# Patient Record
Sex: FEMALE | Race: WHITE | Employment: UNEMPLOYED | URBAN - METROPOLITAN AREA
[De-identification: names, ages, dates, MRNs, and addresses within clinical notes are randomized per-mention and may not be internally consistent; named-entity substitution may affect disease eponyms.]

---

## 2018-10-12 ENCOUNTER — OFFICE VISIT (OUTPATIENT)
Dept: NEUROLOGY | Age: 23
End: 2018-10-12

## 2018-10-12 VITALS
TEMPERATURE: 98 F | HEART RATE: 80 BPM | WEIGHT: 277.5 LBS | HEIGHT: 64 IN | DIASTOLIC BLOOD PRESSURE: 85 MMHG | OXYGEN SATURATION: 99 % | BODY MASS INDEX: 47.37 KG/M2 | RESPIRATION RATE: 18 BRPM | SYSTOLIC BLOOD PRESSURE: 121 MMHG

## 2018-10-12 DIAGNOSIS — H53.9 VISUAL DISTURBANCE: ICD-10-CM

## 2018-10-12 DIAGNOSIS — H54.7 VISUAL LOSS: Primary | ICD-10-CM

## 2018-10-12 DIAGNOSIS — R07.9 CHEST PAIN, UNSPECIFIED TYPE: ICD-10-CM

## 2018-10-12 DIAGNOSIS — R42 DIZZY: ICD-10-CM

## 2018-10-12 DIAGNOSIS — G44.59 OTHER COMPLICATED HEADACHE SYNDROME: ICD-10-CM

## 2018-10-12 PROBLEM — E66.01 OBESITY, MORBID (HCC): Status: ACTIVE | Noted: 2018-10-12

## 2018-10-12 RX ORDER — NORGESTIMATE AND ETHINYL ESTRADIOL 0.25-0.035
1 KIT ORAL DAILY
COMMUNITY

## 2018-10-12 RX ORDER — HYDROXYZINE HYDROCHLORIDE 10 MG/1
10 TABLET, FILM COATED ORAL
COMMUNITY

## 2018-10-12 RX ORDER — CHOLECALCIFEROL TAB 125 MCG (5000 UNIT) 125 MCG
5000 TAB ORAL DAILY
COMMUNITY

## 2018-10-12 RX ORDER — BUPROPION HYDROCHLORIDE 300 MG/1
300 TABLET ORAL
COMMUNITY

## 2018-10-12 RX ORDER — DEXAMETHASONE 2 MG/1
TABLET ORAL
Qty: 20 TAB | Refills: 0 | Status: SHIPPED | OUTPATIENT
Start: 2018-10-12

## 2018-10-12 NOTE — PROGRESS NOTES
I have reviewed the documentation provided by the nurse practitioner, Mrs. Tam December, and we have discussed her findings and the clinical impression. I have formulated with her the proposed management plans for this patient. Additionally,  I have personally evaluated the patient to verify the history and to confirm physical findings. Below are my additional comments: 
27-year-old woman who presents accompanied by her mother for ongoing headaches. She has had headaches and she was 16years of age. She never sought any treatment for them. She notes that she would take an occasional Advil. Over the last several months she has had increasing headaches to the point of its a daily headache. She is not using any medication. The only thing that she found that would help her was Advil and in questioning her she might take an Advil twice a month. Her mother adds that the grandmother apparently would take large amounts of Tylenol and that ended up affecting her liver so they therefore do not use Tylenol at all and they minimize any type of medication that they take. Interestingly the headache she has now she says are similar to the headache she had when she was younger although these are much more intense. In addition to the headache she is also having visual disturbance where she is actually lost vision in the left upper quadrant that lasted for a few seconds to a minute. She is also had visual disturbances including what she calls the sparkles are fairy dust where she sees sparkling shimmering lights that occurs a couple 3 times a month. She also had an episode while driving where she noted the road became distorted. She does not get a headache after this. Cannot correlate it to any type of head pain or activity. Nothing positional.  She feels lightheaded and dizzy but does not pass out. She has not had any focal deficits. There is no family history of headache per her mother.   She initially thought her headache problems were due to eyeglasses and she got a pair of eyeglasses changed last [place] or so and she is not had any difference in her symptoms. She has not fallen out on the floor. Does not awaken having bitten her tongue or wet herself. She has not had any testing. This is the first evaluation she has had. She is never been on any prescription medication or either in a preventative sense or an abortive signs for headaches. She is also having some chest pain intermittent as described. She is not seeing cardiology. Mother adds that the there are family members who have had MIs in their 25s. Examination today finds her to be obese. (She notes that her weight is been stable) she has sharp disc margins. Cranial nerves intact 2 through 12. No nystagmus. No pronation or drift and no abnormal movement. Bulk and tone are normal.  Strength is full throughout. Reflexes are a bit brisk but symmetrical throughout. No pathologic reflexes elicited. No ataxia. 52-year-old woman with history of headaches which from her younger days seeing migraine is now in more of a chronic daily headache type situation and this is not being driven by analgesic overuse differential diagnosis certainly includes transformed migraine versus the structural abnormality which may be related versus other. Pseudotumor I think even though she is obese is lower on the list secondary to the sharp disc margins and therefore that would exclude pseudotumor cerebra. She at this point will undergo MRI of the brain will for structural causes of secondary headache. She will have a carotid Doppler and EEG as well as a visual evoked potential secondary to the visual disturbances and the lightheadedness. We will give her a Decadron taper to see if we can break this headache cycle. No routine medications ordered today. Follow-up after her testing. Maximo Leos MD 
This note will not be viewable in [address]

## 2018-10-12 NOTE — MR AVS SNAPSHOT
[address] [place], [place] [address] Vonda Spencer [address] [facility] 99 [id number] [phone] Patient: Sg Crystal MRN: [id number] :1995 Visit Information Date & Time Provider Department Dept. Phone Encounter #  
 10/12/2018  1:30 PM Robe Hawkins MD [facility] Neurology [facility] [phone] [id number] Follow-up Instructions Return for After Tests. Upcoming Health Maintenance Date Due  
 HPV Age 9Y-34Y (1 of 1 - Female 3 Dose Series) 2007 DTaP/Tdap/Td series (1 - Tdap) 2017 PAP AKA CERVICAL CYTOLOGY 2017 Influenza Age 5 to Adult 2018 Allergies as of 10/12/2018  Review Complete On: 10/12/2018 By: Denny Rodrigez NP No Known Allergies Current Immunizations  Never Reviewed No immunizations on file. Not reviewed this visit You Were Diagnosed With   
  
 Codes Comments Visual loss    -  Primary ICD-10-CM: H54.7 ICD-9-CM: 369.9 Visual disturbance     ICD-10-CM: H53.9 ICD-9-CM: 368.9 Dizzy     ICD-10-CM: R44 ICD-9-CM: 780.4 Other complicated headache syndrome     ICD-10-CM: G44.59 
ICD-9-CM: 339.44 Chest pain, unspecified type     ICD-10-CM: R07.9 ICD-9-CM: 786.50 Vitals BP Pulse Temp Resp Height(growth percentile) Weight(growth percentile) 121/85 (BP 1 Location: Left arm, BP Patient Position: Sitting) 80 98 °F (36.7 °C) (Oral) 18 5' 4.3\" (1.633 m) 277 lb 8 oz (125.9 kg) SpO2 BMI Smoking Status 99% 47.19 kg/m2 Never Smoker BMI and BSA Data Body Mass Index Body Surface Area  
 47.19 kg/m 2 2.39 m 2 [facility] [address] [phone] Your Updated Medication List  
  
   
This list is accurate as of 10/12/18  3:09 PM.  Always use your most recent med list.  
  
  
  
  
 buPROPion  mg XL tablet Commonly known as:  Mallory Domenica Take 300 mg by mouth every morning. dexamethasone 2 mg tablet Commonly known as:  DECADRON  
3 po every day x 3 then 2 po every day x 3 then 1 po every day x 3 then stop  
  
 hydrOXYzine HCl 10 mg tablet Commonly known as:  ATARAX Take 10 mg by mouth nightly as needed for Itching. [facility] () 0.25-35 mg-mcg Tab Generic drug:  norgestimate-ethinyl estradiol Take 1 Tab by mouth daily. VITAMIN D3 5,000 unit Tab tablet Generic drug:  cholecalciferol (VITAMIN D3) Take 5,000 Units by mouth daily. Prescriptions Sent to Pharmacy Refills  
 dexamethasone (DECADRON) 2 mg tablet 0 Sig: 3 po every day x 3 then 2 po every day x 3 then 1 po every day x 3 then stop Class: Normal  
 Pharmacy: [address] #: [phone] We Performed the Following REFERRAL TO CARDIOLOGY [UYZ41 Custom] Comments:  
 Chest pain VISUAL EVOKED POTENTIAL TEST [83213 CPT(R)] Follow-up Instructions Return for After Tests. To-Do List   
 10/12/2018 Imaging:  DUPLEX CAROTID BILATERAL AMB NEURO   
  
 10/12/2018 Neurology:  EEG AMB NEURO   
  
 10/12/2018 Imaging:  MRI BRAIN W WO CONT Referral Information Referral ID Referred By Referred To  
  
 [id number] Darrick CASTILLO MD   
   [place] [address] [address] [place] [address] Phone: [phone] Fax: [phone] Visits Status Start Date End Date 1 New Request 10/12/18 10/12/19 If your referral has a status of pending review or denied, additional information will be sent to support the outcome of this decision. Introducing [facility]! [employer] introduces [employer] patient portal. Now you can access parts of your medical record, email your doctor's office, and request medication refills online.    
 
1. In your internet browser, go to [email or web address] 2. Click on the First Time User? Click Here link in the Sign In box. You will see the New Member Sign Up page. 3. Enter your PushPoint Access Code exactly as it appears below. You will not need to use this code after youve completed the sign-up process. If you do not sign up before the expiration date, you must request a new code. · PushPoint Access Code: [email or web address] Expires: 1/10/2019  1:11 PM 
 
4. Enter the last four digits of your Social Security Number (xxxx) and Date of Birth (mm/dd/yyyy) as indicated and click Submit. You will be taken to the next sign-up page. 5. Create a [employer] ID. This will be your PushPoint login ID and cannot be changed, so think of one that is secure and easy to remember. 6. Create a PushPoint password. You can change your password at any time. 7. Enter your Password Reset Question and Answer. This can be used at a later time if you forget your password. 8. Enter your e-mail address. You will receive e-mail notification when new information is available in [address] 9. Click Sign Up. You can now view and download portions of your medical record. 10. Click the Download Summary menu link to download a portable copy of your medical information. If you have questions, please visit the Frequently Asked Questions section of the PushPoint website. Remember, PushPoint is NOT to be used for urgent needs. For medical emergencies, dial 911. Now available from your iPhone and Android! Please provide this summary of care documentation to your next provider. Your primary care clinician is listed as Phys Other. If you have any questions after today's visit, please call [phone].

## 2018-10-12 NOTE — PROGRESS NOTES
Tamie We-07-A [address]  
[address] [phone] Fax Referring: Dr. Dee Alex Chief Complaint Patient presents with  Migraine  New Patient CC- Patient presents with her mother for evaluation of migraines. HPI-  Patient reports she started with headaches at about 16years old and they have gradually worsened over the years. At first her headaches were once a month, then up to several times per week, and for the past one month they have been almost daily (maybe 2 days her head did not hurt in past one monht). Her headaches start with a feeling of pressure inside her nose and the pain starts in her maxillary sinuses area bilaterally and then spreads to her frontal area and then her entire head and her posterior neck feels sore until she moves her neck and cracks it. Her headache pain is throbbing or sometimes squeezing (on the top of her head), the intensity is 4/10 earlier in the day and then increases to 10/10 during the day, she has phonophobia and photophobia, nausea (which has increased in past 2 weeks) and sometimes vomiting. She sometimes gets fuzzy vision before the onset of her headaches for a few minutes She denies any numbness, tingling or weakness. Patient reports her headaches get worse around the middle of her menses. She has not other known headache triggers. She used to take advil once a week, but she stopped taking it one month ago due to it did not help. She denies taking any other pain medications. She states she \"just sleeps\" which sometimes helps her headaches. She has never taken a preventative medication for her headaches. She has never been evaluated for headaches. Five days ago when she was driving the road seemed to sway and her body felt light and she felt nausea- this lasted about 10 minutes.   Yesterday, when she was driving she had sudden onset of very fuzzy vision in the upper outer quadrant of her left eye that lasted about 2-3 seconds. She also reports seeing occasional  \"sparklies\"  in both of her eyes about 2-3 times per month. She stats her last eye exam was in 12/17. She started having intermittent dizziness/lighheadedness ever  since her headaches started at 16years of age, but this has been worse in the past one month. She currently feels dizziness every few minutes and she also somtimes feels like there is cotton in her ears for a few seconds and her balance feels off when she is dizzy. She also started having vertigo (room spinning) a few times in the past 2 month which lasts about 10 minutes. She denies any tinnitus or hearing loss. She denies any fainting, jerking, twitching or incontinence. She states her weight has been the same for the past 6 months. For the past one year she has had intermittent squeezing chest pain about 2 times per week that lasts 2 minutes. She has not reported this to her PCP or any doctor. PMH-  She sees her PCP yearly for physicals. She has no known thyroid problems or diabetes. She states her last blood work was 3 months ago and her Vitamin D was low so she was started on a  daily Vitamin D supplement. She has had anxiety and depression since she was 14years old . She has been taking Wellbutrin for the past 6 months which she states has helped her anxiety and depression. She denies any suicidal ideation. She is treated by the NP at her PCP's office. She used to see a therapist in 7th and 8th grade. .  
 
Patient states she sleeps through the night, but still wakes up tired in the morning. She denies any sinus problems. She has not had a Head CT or Brain MRI. FH- no migraines. Positive for heart disease. Social/personal-  lives with her Tacy Crigler,  works 2 part time jobs and goes to [employer].   
Alcohol- no  
 Smoking - no 
Drugs-  No  
 
Past Medical History:  
Diagnosis Date  Anxiety  Asthma  Depression History reviewed. No pertinent surgical history. Current Outpatient Prescriptions Medication Sig Dispense Refill  buPROPion XL (WELLBUTRIN XL) 300 mg XL tablet Take 300 mg by mouth every morning.  cholecalciferol, VITAMIN D3, (VITAMIN D3) 5,000 unit tab tablet Take 5,000 Units by mouth daily.  norgestimate-ethinyl estradiol (SPRINTEC, 28,) 0.25-35 mg-mcg tab Take 1 Tab by mouth daily.  hydrOXYzine HCl (ATARAX) 10 mg tablet Take 10 mg by mouth nightly as needed for Itching.     0 No Known Allergies Social History Substance Use Topics  Smoking status: Never Smoker  Smokeless tobacco: Never Used  Alcohol use No  
 
 
Family History Problem Relation Age of Onset  Asthma Mother  Depression Mother  No Known Problems Father  Other Maternal Grandfather   
  hypoglycemia  Diabetes Paternal Grandfather  Cancer Paternal Aunt   
  breast  
 Cancer Maternal Grandmother   
  breast  
 
  
Review of Systems-  history obtained from patient. General ROS: negative Psychological ROS: see HPI HEENT ROS: current 7/10 headache. See HPI for eye symptoms. Denies any current blurred or double vision. Denies any eye pain. Hematological and Lymphatic ROS: negative Endocrine ROS:negative Respiratory ROS: negative, denies any SOB Cardiovascular ROS: see HPI Gastrointestinal ROS: has mild nausea and intermittent abdominal cramping Genito-Urinary ROS: negative Musculoskeletal ROS: negative Neurological ROS: see HPI Dermatological ROS: negative Examination Visit Vitals  /85 (BP 1 Location: Left arm, BP Patient Position: Sitting)  Pulse 80  Temp 98 °F (36.7 °C) (Oral)  Resp 18  Ht 5' 4.3\" (1.633 m)  Wt 125.9 kg (277 lb 8 oz)  SpO2 99%  BMI 47.19 kg/m2 Physical Exam -  
  
 General - Patient is alert in NAD, she is obese, well groomed and color normal.  
HEENT- head is normocephalic, sclera clear,  nose and throat are clear Neck- supple, no carotid bruit Chest - CTA A/P/L, full breath sounds bilaterally Heart - RRR, no murmur Abdomen- not distended, no tender to palpation Musculoskeletal- normal posture, FROM of joints Extremities - warm and no edema Skin- no rashes or lesions Psychiatric- normal mood and bright affect 
  
Neurological- Alert and oriented to person, place and time. Speech is clear- no aphasia or dysarthria. Recent and remote memory appear intact. Cranial nerves II-XII are intact- visual acuity is grossly intact, visual fields are full to confrontation,  Fundoscopic- no papilledema, PERRLA, EOMS intact, no nystagmus or ptosis, facial sensation normal and masseter strength intact, no facial asymmetry, facial movements are symmetrical and normal strength, hearing intact, palate rises symmetrically, sternocleidomastoid and trapezius strength 5/5 bilaterally, tongue midline. Motor System- strength 5/5 to upper and lower extremities bilaterally, normal muscle bulk and tone, no tremor. Sensation- intact to light touch and temperature throughout. Gait- normal and steady; toe, heel and tandem gait intact. Coordination - finger to nose accurate, SHRUTHI intact, Romberg negative, no pronator drift. Reflexes- 2+ to upper and lower extremities bilaterally.  
  
Fundoscopic repeated by Dr. Mary Hagan and disc margins sharp . Impression: Complicated headache syndrome, visual disturbances and  intermittent dizziness. Also intermittent chest pain for the past one year. Above history and exam findings reviewed with Dr. Mary Hagan who also spoke with patient and confirmed exam findings. Plan below formulated with Dr. Mary Hagan. PLAN-  
 
1.  In order to try to break current headache cycle:  
dexamethasone (DECADRON) 2 mg tablet 3 po every day x 3 then 2 po every day x 3 then 1 po every day x 3 then stop 20 Tab 2. Brain MRI w/w/o 3. EEG 4. Carotid Doppler 5. Visual evoked potential  
 
6. Patient referred to cardiology for evaluation of intermittent chest pain for the past one year. 7. Follow up post tests. Selin Graff, [facility] This note will not be viewable in [address] I have reviewed the documentation provided by the nurse practitioner, Mrs. Tam December, and we have discussed her findings and the clinical impression. I have formulated with her the proposed management plans for this patient. Additionally,  I have personally evaluated the patient to verify the history and to confirm physical findings. Below are my additional comments: 
78-year-old woman who presents accompanied by her mother for ongoing headaches. She has had headaches and she was 16years of age. She never sought any treatment for them. She notes that she would take an occasional Advil. Over the last several months she has had increasing headaches to the point of its a daily headache. She is not using any medication. The only thing that she found that would help her was Advil and in questioning her she might take an Advil twice a month. Her mother adds that the grandmother apparently would take large amounts of Tylenol and that ended up affecting her liver so they therefore do not use Tylenol at all and they minimize any type of medication that they take. Interestingly the headache she has now she says are similar to the headache she had when she was younger although these are much more intense. In addition to the headache she is also having visual disturbance where she is actually lost vision in the left upper quadrant that lasted for a few seconds to a minute. She is also had visual disturbances including what she calls the sparkles are fairy dust where she sees sparkling shimmering lights that occurs a couple 3 times a month. She also had an episode while driving where she noted the road became distorted. She does not get a headache after this. Cannot correlate it to any type of head pain or activity. Nothing positional.  She feels lightheaded and dizzy but does not pass out. She has not had any focal deficits. There is no family history of headache per her mother. She initially thought her headache problems were due to eyeglasses and she got a pair of eyeglasses changed last [place] or so and she is not had any difference in her symptoms. She has not fallen out on the floor. Does not awaken having bitten her tongue or wet herself. She has not had any testing. This is the first evaluation she has had. She is never been on any prescription medication or either in a preventative sense or an abortive signs for headaches. She is also having some chest pain intermittent as described. She is not seeing cardiology. Mother adds that the there are family members who have had MIs in their 25s. Examination today finds her to be obese. (She notes that her weight is been stable) she has sharp disc margins. Cranial nerves intact 2 through 12. No nystagmus. No pronation or drift and no abnormal movement. Bulk and tone are normal.  Strength is full throughout. Reflexes are a bit brisk but symmetrical throughout. No pathologic reflexes elicited. No ataxia. 20-year-old woman with history of headaches which from her younger days seeing migraine is now in more of a chronic daily headache type situation and this is not being driven by analgesic overuse differential diagnosis certainly includes transformed migraine versus the structural abnormality which may be related versus other. Pseudotumor I think even though she is obese is lower on the list secondary to the sharp disc margins and therefore that would exclude pseudotumor cerebra.   She at this point will undergo MRI of the brain will for structural causes of secondary headache. She will have a carotid Doppler and EEG as well as a visual evoked potential secondary to the visual disturbances and the lightheadedness. We will give her a Decadron taper to see if we can break this headache cycle. No routine medications ordered today. Follow-up after her testing. Ritu Velázquez MD 
This note will not be viewable in [address]

## 2018-10-23 ENCOUNTER — OFFICE VISIT (OUTPATIENT)
Dept: NEUROLOGY | Age: 23
End: 2018-10-23

## 2018-10-23 ENCOUNTER — TELEPHONE (OUTPATIENT)
Dept: NEUROLOGY | Age: 23
End: 2018-10-23

## 2018-10-23 DIAGNOSIS — H53.9 VISUAL DISTURBANCE: Primary | ICD-10-CM

## 2018-10-23 DIAGNOSIS — G44.59 OTHER COMPLICATED HEADACHE SYNDROME: ICD-10-CM

## 2018-10-23 DIAGNOSIS — H53.9 VISUAL DISTURBANCE: ICD-10-CM

## 2018-10-23 DIAGNOSIS — H54.7 VISUAL LOSS: ICD-10-CM

## 2018-10-23 DIAGNOSIS — R07.9 CHEST PAIN, UNSPECIFIED TYPE: ICD-10-CM

## 2018-10-23 DIAGNOSIS — R42 DIZZY: ICD-10-CM

## 2018-10-23 NOTE — PROCEDURES
EEG:      Date:  10/23/2018     Requesting Physician:  Jn Arshad MD    An EEG is requested in this 25 y.o. with paroxysm and dizziness to evaluate for epileptiform abnormalities. Medications:  Medications are listed as Atarax, Wellbutrin and vitamins. This tracing is obtained during the awake state. During wakefulness there are intermittent runs of posteriorly-dominant and symmetrical low-to-medium amplitude 11 cycle per second activities which attenuate with eye opening. Lower-voltage faster-frequency activities are seen symmetrically over the anterior head regions. Hyperventilation is performed for three minutes with poor effort and little alters the tracing. Intermittent photic stimulation little alters the tracing. Sleep is not attained. Interpretation: This EEG recorded during the awake state is normal.  No epileptiform abnormalities are seen.

## 2018-10-24 ENCOUNTER — HOSPITAL ENCOUNTER (OUTPATIENT)
Dept: MRI IMAGING | Age: 23
Discharge: HOME | End: 2018-10-24
Attending: PSYCHIATRY & NEUROLOGY

## 2018-10-24 DIAGNOSIS — G44.59 OTHER COMPLICATED HEADACHE SYNDROME: ICD-10-CM

## 2018-10-24 DIAGNOSIS — H53.9 VISUAL DISTURBANCE: ICD-10-CM

## 2018-10-24 DIAGNOSIS — R07.9 CHEST PAIN, UNSPECIFIED TYPE: ICD-10-CM

## 2018-10-24 DIAGNOSIS — R42 DIZZY: ICD-10-CM

## 2018-10-24 DIAGNOSIS — H54.7 VISUAL LOSS: ICD-10-CM

## 2018-10-25 DIAGNOSIS — H53.9 VISUAL DISTURBANCE: Primary | ICD-10-CM

## 2018-10-25 RX ORDER — DIAZEPAM 5 MG/1
TABLET ORAL
Qty: 2 TAB | Refills: 0 | Status: SHIPPED | OUTPATIENT
Start: 2018-10-25

## 2018-10-26 ENCOUNTER — TELEPHONE (OUTPATIENT)
Dept: NEUROLOGY | Age: 23
End: 2018-10-26

## 2018-10-26 NOTE — TELEPHONE ENCOUNTER
Left message that RX for valium has been called to the [facility] for patent to take 30 min prior to MRI and to have a  for the study.

## 2018-10-31 NOTE — PROCEDURES
[facility] [place]   Visual Evoked Potential Report    Date of Service: 10/23/2018  Referring: Dr. Erica Munoz  Indication: Visual Disturbance    Bilateral full field pattern reversal visual evoked potential is performed. Waveforms are appropriate for interpretation. Absolute latency of the P100 is normal bilaterally.     Interpretation  Normal Study    Seymour Lord MD

## 2018-11-06 ENCOUNTER — HOSPITAL ENCOUNTER (OUTPATIENT)
Dept: MRI IMAGING | Age: 23
Discharge: HOME | End: 2018-11-06
Attending: PSYCHIATRY & NEUROLOGY
Payer: COMMERCIAL

## 2018-11-06 DIAGNOSIS — H53.9 VISUAL DISTURBANCE: ICD-10-CM

## 2018-11-06 DIAGNOSIS — R07.9 CHEST PAIN, UNSPECIFIED TYPE: ICD-10-CM

## 2018-11-06 DIAGNOSIS — G44.59 OTHER COMPLICATED HEADACHE SYNDROME: ICD-10-CM

## 2018-11-06 DIAGNOSIS — H54.7 VISUAL LOSS: ICD-10-CM

## 2018-11-06 DIAGNOSIS — R42 DIZZY: ICD-10-CM

## 2018-11-06 PROCEDURE — 70551 MRI BRAIN STEM W/O DYE: CPT

## 2019-05-01 NOTE — PROGRESS NOTES
EMG/ NCS Report  [facility], [address]  Ph: [phone]/ [phone]  FAX: [phone]/ [phone]  Test Date:  10/23/2018    Patient: Jaki Sanchez : 1995 Physician: Bijan Busch   Sex: Female Height: ' \" Ref Phys: Carol Macias MD   ID#: [id number] Weight:  lbs. Technician: Dinah Reason     Patient History / Exam:  CC:VISUAL DISTURBANCE,PT C/O OF BLURRED VISION AND SPOTS. INCREASED MIGERAINES. EMG & NCV Findings:    Impression:        ___________________________  JAROD CASTILLO MD      VEP     Trace N75 (ms) P100 (ms) N145 (ms) A23-V211 (µV)   Norm  <117     *L : O1-Cz 69.9 101.2 150.0 2.27   *R : O1-Cz 69.9 94.9 119.9 2.35   L-R Norm  <7     L-R 0.0 6.3 30.1 0.08   *A 92 millisecond delay was used for the monitor.                    Waveforms:
wean off O2 in rehab

## 2022-02-08 NOTE — TELEPHONE ENCOUNTER
Patient wants to talk with the nurse about not being able to do the MRI .  Patient needs something for her nerves
(4) rarely moist

## 2022-07-06 ENCOUNTER — APPOINTMENT (OUTPATIENT)
Dept: ULTRASOUND IMAGING | Age: 27
End: 2022-07-06
Attending: STUDENT IN AN ORGANIZED HEALTH CARE EDUCATION/TRAINING PROGRAM
Payer: COMMERCIAL

## 2022-07-06 ENCOUNTER — HOSPITAL ENCOUNTER (OUTPATIENT)
Age: 27
Setting detail: OBSERVATION
Discharge: HOME | End: 2022-07-07
Attending: STUDENT IN AN ORGANIZED HEALTH CARE EDUCATION/TRAINING PROGRAM | Admitting: OBSTETRICS & GYNECOLOGY
Payer: COMMERCIAL

## 2022-07-06 DIAGNOSIS — K80.20 CALCULUS OF GALLBLADDER WITHOUT CHOLECYSTITIS WITHOUT OBSTRUCTION: Primary | ICD-10-CM

## 2022-07-06 DIAGNOSIS — R42 LIGHTHEADEDNESS: ICD-10-CM

## 2022-07-06 DIAGNOSIS — R74.01 ELEVATED LIVER TRANSAMINASE LEVEL: ICD-10-CM

## 2022-07-06 DIAGNOSIS — R74.01 TRANSAMINITIS: ICD-10-CM

## 2022-07-06 LAB
ALBUMIN SERPL-MCNC: 2.7 G/DL (ref 3.5–5)
ALBUMIN/GLOB SERPL: 0.6 {RATIO} (ref 1.1–2.2)
ALP SERPL-CCNC: 143 U/L (ref 45–117)
ALT SERPL-CCNC: 403 U/L (ref 12–78)
AMYLASE SERPL-CCNC: 49 U/L (ref 25–115)
ANION GAP SERPL CALC-SCNC: 8 MMOL/L (ref 5–15)
APAP SERPL-MCNC: <2 UG/ML (ref 10–30)
APPEARANCE UR: ABNORMAL
AST SERPL-CCNC: 194 U/L (ref 15–37)
BACTERIA URNS QL MICRO: ABNORMAL /HPF
BASOPHILS # BLD: 0 K/UL (ref 0–0.1)
BASOPHILS NFR BLD: 0 % (ref 0–1)
BILIRUB SERPL-MCNC: 1 MG/DL (ref 0.2–1)
BILIRUB UR QL CFM: NEGATIVE
BUN SERPL-MCNC: 8 MG/DL (ref 6–20)
BUN/CREAT SERPL: 14 (ref 12–20)
CALCIUM SERPL-MCNC: 8.9 MG/DL (ref 8.5–10.1)
CHLORIDE SERPL-SCNC: 106 MMOL/L (ref 97–108)
CO2 SERPL-SCNC: 23 MMOL/L (ref 21–32)
COLOR UR: ABNORMAL
COMMENT, HOLDF: NORMAL
CREAT SERPL-MCNC: 0.58 MG/DL (ref 0.55–1.02)
DIFFERENTIAL METHOD BLD: ABNORMAL
EOSINOPHIL # BLD: 0 K/UL (ref 0–0.4)
EOSINOPHIL NFR BLD: 0 % (ref 0–7)
EPITH CASTS URNS QL MICRO: ABNORMAL /LPF
ERYTHROCYTE [DISTWIDTH] IN BLOOD BY AUTOMATED COUNT: 16.7 % (ref 11.5–14.5)
FERRITIN SERPL-MCNC: 5 NG/ML (ref 26–388)
GLOBULIN SER CALC-MCNC: 4.2 G/DL (ref 2–4)
GLUCOSE SERPL-MCNC: 89 MG/DL (ref 65–100)
GLUCOSE UR STRIP.AUTO-MCNC: NEGATIVE MG/DL
HAPTOGLOB SERPL-MCNC: 164 MG/DL (ref 30–200)
HCT VFR BLD AUTO: 31.6 % (ref 35–47)
HGB BLD-MCNC: 9.6 G/DL (ref 11.5–16)
HGB UR QL STRIP: NEGATIVE
IMM GRANULOCYTES # BLD AUTO: 0 K/UL (ref 0–0.04)
IMM GRANULOCYTES NFR BLD AUTO: 0 % (ref 0–0.5)
IRON SATN MFR SERPL: 4 % (ref 20–50)
IRON SERPL-MCNC: 26 UG/DL (ref 35–150)
KETONES UR QL STRIP.AUTO: ABNORMAL MG/DL
LDH SERPL L TO P-CCNC: 195 U/L (ref 81–246)
LEUKOCYTE ESTERASE UR QL STRIP.AUTO: ABNORMAL
LIPASE SERPL-CCNC: 129 U/L (ref 73–393)
LYMPHOCYTES # BLD: 2.2 K/UL (ref 0.8–3.5)
LYMPHOCYTES NFR BLD: 23 % (ref 12–49)
MCH RBC QN AUTO: 22.1 PG (ref 26–34)
MCHC RBC AUTO-ENTMCNC: 30.4 G/DL (ref 30–36.5)
MCV RBC AUTO: 72.6 FL (ref 80–99)
MONOCYTES # BLD: 0.6 K/UL (ref 0–1)
MONOCYTES NFR BLD: 6 % (ref 5–13)
NEUTS SEG # BLD: 6.7 K/UL (ref 1.8–8)
NEUTS SEG NFR BLD: 70 % (ref 32–75)
NITRITE UR QL STRIP.AUTO: NEGATIVE
NRBC # BLD: 0 K/UL (ref 0–0.01)
NRBC BLD-RTO: 0 PER 100 WBC
PH UR STRIP: 6 [PH] (ref 5–8)
PLATELET # BLD AUTO: 311 K/UL (ref 150–400)
PMV BLD AUTO: 10.2 FL (ref 8.9–12.9)
POTASSIUM SERPL-SCNC: 4 MMOL/L (ref 3.5–5.1)
PROT SERPL-MCNC: 6.9 G/DL (ref 6.4–8.2)
PROT UR STRIP-MCNC: 30 MG/DL
RBC # BLD AUTO: 4.35 M/UL (ref 3.8–5.2)
RBC #/AREA URNS HPF: ABNORMAL /HPF (ref 0–5)
SAMPLES BEING HELD,HOLD: NORMAL
SODIUM SERPL-SCNC: 137 MMOL/L (ref 136–145)
SP GR UR REFRACTOMETRY: 1.02 (ref 1–1.03)
TIBC SERPL-MCNC: 589 UG/DL (ref 250–450)
UR CULT HOLD, URHOLD: NORMAL
UROBILINOGEN UR QL STRIP.AUTO: 1 EU/DL (ref 0.2–1)
WBC # BLD AUTO: 9.5 K/UL (ref 3.6–11)
WBC URNS QL MICRO: ABNORMAL /HPF (ref 0–4)

## 2022-07-06 PROCEDURE — 76705 ECHO EXAM OF ABDOMEN: CPT

## 2022-07-06 PROCEDURE — 80053 COMPREHEN METABOLIC PANEL: CPT

## 2022-07-06 PROCEDURE — 99285 EMERGENCY DEPT VISIT HI MDM: CPT

## 2022-07-06 PROCEDURE — 86664 EPSTEIN-BARR NUCLEAR ANTIGEN: CPT

## 2022-07-06 PROCEDURE — 83615 LACTATE (LD) (LDH) ENZYME: CPT

## 2022-07-06 PROCEDURE — 80143 DRUG ASSAY ACETAMINOPHEN: CPT

## 2022-07-06 PROCEDURE — 83010 ASSAY OF HAPTOGLOBIN QUANT: CPT

## 2022-07-06 PROCEDURE — 86381 MITOCHONDRIAL ANTIBODY EACH: CPT

## 2022-07-06 PROCEDURE — 81001 URINALYSIS AUTO W/SCOPE: CPT

## 2022-07-06 PROCEDURE — G0378 HOSPITAL OBSERVATION PER HR: HCPCS

## 2022-07-06 PROCEDURE — 80074 ACUTE HEPATITIS PANEL: CPT

## 2022-07-06 PROCEDURE — 82390 ASSAY OF CERULOPLASMIN: CPT

## 2022-07-06 PROCEDURE — 99203 OFFICE O/P NEW LOW 30 MIN: CPT | Performed by: SURGERY

## 2022-07-06 PROCEDURE — 85025 COMPLETE CBC W/AUTO DIFF WBC: CPT

## 2022-07-06 PROCEDURE — 82150 ASSAY OF AMYLASE: CPT

## 2022-07-06 PROCEDURE — 86038 ANTINUCLEAR ANTIBODIES: CPT

## 2022-07-06 PROCEDURE — 74011250636 HC RX REV CODE- 250/636: Performed by: STUDENT IN AN ORGANIZED HEALTH CARE EDUCATION/TRAINING PROGRAM

## 2022-07-06 PROCEDURE — 83690 ASSAY OF LIPASE: CPT

## 2022-07-06 PROCEDURE — 82728 ASSAY OF FERRITIN: CPT

## 2022-07-06 PROCEDURE — 74011250636 HC RX REV CODE- 250/636: Performed by: OBSTETRICS & GYNECOLOGY

## 2022-07-06 PROCEDURE — 96361 HYDRATE IV INFUSION ADD-ON: CPT

## 2022-07-06 PROCEDURE — 86644 CMV ANTIBODY: CPT

## 2022-07-06 PROCEDURE — 96360 HYDRATION IV INFUSION INIT: CPT

## 2022-07-06 PROCEDURE — 87086 URINE CULTURE/COLONY COUNT: CPT

## 2022-07-06 PROCEDURE — 36415 COLL VENOUS BLD VENIPUNCTURE: CPT

## 2022-07-06 PROCEDURE — 83540 ASSAY OF IRON: CPT

## 2022-07-06 PROCEDURE — 74011250637 HC RX REV CODE- 250/637: Performed by: OBSTETRICS & GYNECOLOGY

## 2022-07-06 RX ORDER — ONDANSETRON 2 MG/ML
4 INJECTION INTRAMUSCULAR; INTRAVENOUS
Status: DISCONTINUED | OUTPATIENT
Start: 2022-07-06 | End: 2022-07-07 | Stop reason: HOSPADM

## 2022-07-06 RX ORDER — ACETAMINOPHEN 500 MG
1000 TABLET ORAL
Status: DISCONTINUED | OUTPATIENT
Start: 2022-07-06 | End: 2022-07-06

## 2022-07-06 RX ORDER — SODIUM CHLORIDE 0.9 % (FLUSH) 0.9 %
5-40 SYRINGE (ML) INJECTION EVERY 8 HOURS
Status: DISCONTINUED | OUTPATIENT
Start: 2022-07-06 | End: 2022-07-07 | Stop reason: HOSPADM

## 2022-07-06 RX ORDER — SODIUM CHLORIDE, SODIUM LACTATE, POTASSIUM CHLORIDE, CALCIUM CHLORIDE 600; 310; 30; 20 MG/100ML; MG/100ML; MG/100ML; MG/100ML
125 INJECTION, SOLUTION INTRAVENOUS CONTINUOUS
Status: DISCONTINUED | OUTPATIENT
Start: 2022-07-06 | End: 2022-07-07 | Stop reason: HOSPADM

## 2022-07-06 RX ORDER — SODIUM CHLORIDE 0.9 % (FLUSH) 0.9 %
5-40 SYRINGE (ML) INJECTION AS NEEDED
Status: DISCONTINUED | OUTPATIENT
Start: 2022-07-06 | End: 2022-07-07 | Stop reason: HOSPADM

## 2022-07-06 RX ORDER — SODIUM CHLORIDE, SODIUM LACTATE, POTASSIUM CHLORIDE, CALCIUM CHLORIDE 600; 310; 30; 20 MG/100ML; MG/100ML; MG/100ML; MG/100ML
250 INJECTION, SOLUTION INTRAVENOUS CONTINUOUS
Status: DISCONTINUED | OUTPATIENT
Start: 2022-07-06 | End: 2022-07-07 | Stop reason: HOSPADM

## 2022-07-06 RX ADMIN — SODIUM CHLORIDE, POTASSIUM CHLORIDE, SODIUM LACTATE AND CALCIUM CHLORIDE 250 ML/HR: 600; 310; 30; 20 INJECTION, SOLUTION INTRAVENOUS at 21:00

## 2022-07-06 RX ADMIN — ACETAMINOPHEN 1000 MG: 500 TABLET ORAL at 21:44

## 2022-07-06 RX ADMIN — SODIUM CHLORIDE 1000 ML: 9 INJECTION, SOLUTION INTRAVENOUS at 09:32

## 2022-07-06 NOTE — ED TRIAGE NOTES
Pt arrives to the ER for complaints of right upper abdominal pain that radiates to back side, symptoms started yesterday afternoon. Pt states she was seen at [facility] ER for same complaints and was diagnosed with gall stones. Pt states that she feels that this is a flare up. Pt is currently 28 weeks pregnant. PMH of pre-eclampsia, asthma, and anxiety.

## 2022-07-06 NOTE — CONSULTS
Surgery Consult    Subjective:      Nakia Crowley is a 23 y.o. white female who presents abdominal pain and gallstones. Earlier in the year, Ms. Andriy Vargas developed an acute onset of epigastric/substernal pain and was diagnosed with gallstones in the ER. The pain has been intermittent, but worsened over the past day. The pain begins in the epigastric region and radiates around both sides to her back. The pain is associated with nausea, vomiting and diarrhea. She denies any fever or jaundice. The pain is not necessarily associated with eating. She denies any h/o PUD, pancreatitis, or liver disease. She has not eaten anything unusual.  Her only abdominal procedure is a . Presently, she denies any pain. She is 28 weeks pregnant now. Past Medical History:   Diagnosis Date    Anxiety     Asthma     Depression      No past surgical history on file. Family History   Problem Relation Age of Onset    Asthma Mother     Depression Mother     No Known Problems Father     Other Maternal Grandfather         hypoglycemia    Diabetes Paternal Grandfather     Cancer Paternal Aunt         breast    Cancer Maternal Grandmother         breast     Social History     Socioeconomic History    Marital status: SINGLE   Tobacco Use    Smoking status: Never Smoker    Smokeless tobacco: Never Used   Substance and Sexual Activity    Alcohol use: No           No Known Allergies    Review of Systems:  A comprehensive review of systems was negative except for that written in the History of Present Illness. Objective:      No data found.     Temp (24hrs), Av.1 °F (36.2 °C), Min:97.1 °F (36.2 °C), Max:97.1 °F (36.2 °C)      Physical Exam:  GENERAL: alert, cooperative, no distress, appears stated age, morbidly obese, EYE: negative findings: anicteric sclera, LYMPHATIC: Cervical, supraclavicular nodes normal. , THROAT & NECK: normal, LUNG: clear to auscultation bilaterally, HEART: regular rate and rhythm, ABDOMEN: Soft, obese, ND, minimal epigastric, RUQ, and LUQ tenderness without guarding. The uterus is consistent with the known pregnancy. , EXTREMITIES:  no edema, SKIN: Normal., NEUROLOGIC: negative, PSYCHIATRIC: non focal    Assessment:     Hospital Problems  Date Reviewed: 10/31/2018          Codes Class Noted POA    Cholelithiasis ICD-10-CM: K80.20  ICD-9-CM: 574.20  7/6/2022 Yes        Elevated liver transaminase level ICD-10-CM: R74.01  ICD-9-CM: 790.4  7/6/2022 Yes              Plan:     I do not believe that Ms. Gill's pain is related to her GB at this time. Given the normal bilirubin with elevation of the transaminases and absence of findings c/w cholecystitis on US, her pain is probably related to some type of hepatitis. I agree with Dr. Royal Porter that she needs further serologic testing. I do not believe that an elective cholecystectomy would benefit her at this time. The plan of care was discussed with Ms. Andriy Varags, and all questions were answered. I appreciate the OB service allowing me to participate in Ms. Gill's care. I will follow her with you as needed.

## 2022-07-06 NOTE — PROGRESS NOTES
1835: pt arrived to L&D for NST/observatin. Pt describes pain as being midline at base of sternum. 1920: Bedside and Verbal shift change report given to CUONG Gatica RN (oncoming nurse) by Cary Bowles RN (offgoing nurse). Report included the following information SBAR, Kardex, Procedure Summary, Intake/Output, MAR, Recent Results, Med Rec Status and Quality Measures.

## 2022-07-06 NOTE — PROGRESS NOTES
GI note    Consult received, chart reviewed. Abdominal pain  Hepatocellular pattern of liver enzyme elevation with ALT 5x ULN, AST 5x ULN, and ALP minimally elevated. Bilirubin normal.    28 weeks pregnant, but platelets normal,  and BP WNL, argues against HELLP but I'm speaking a bit out of turn. Ultrasound shows incidental gallstones, normal biliary ductal size 4mm and normal hepatic parenchyma. I suspect some form of hepatocellular hepatitis, and the differential diagnosis for that is quite broad. Will order screening serologic testing. I do not see that the dataset would support a need for biliary endoscopy (ERCP) and that is not planned unless her clinical parameters change. Full consult to follow.   Jama Connors MD

## 2022-07-06 NOTE — ED PROVIDER NOTES
Patient is a 43-year-old female present emergency department with nausea, vomiting, right upper quadrant abdominal pain. Patient states that she was recently diagnosed with cholelithiasis at [facility]. Patient says that she has been dealing with this for the last 2 months however recently her symptoms have included dizziness and lightheadedness. Patient is currently 28 weeks pregnant denies any fevers. She describes the pain as a sharp stabbing pain right upper quadrant radiating to the back in a bandlike distribution around her abdomen. Past Medical History:   Diagnosis Date    Anxiety     Asthma     Depression        No past surgical history on file. Family History:   Problem Relation Age of Onset    Asthma Mother     Depression Mother     No Known Problems Father     Other Maternal Grandfather         hypoglycemia    Diabetes Paternal Grandfather     Cancer Paternal Aunt         breast    Cancer Maternal Grandmother         breast       Social History     Socioeconomic History    Marital status: SINGLE     Spouse name: Not on file    Number of children: Not on file    Years of education: Not on file    Highest education level: Not on file   Occupational History    Not on file   Tobacco Use    Smoking status: Never Smoker    Smokeless tobacco: Never Used   Substance and Sexual Activity    Alcohol use: No    Drug use: Not on file    Sexual activity: Not on file   Other Topics Concern    Not on file   Social History Narrative    Not on file     Social Determinants of Health     Financial Resource Strain:     Difficulty of Paying Living Expenses: Not on file   Food Insecurity:     Worried About Running Out of Food in the Last Year: Not on file    Chay of Food in the Last Year: Not on file   Transportation Needs:     Lack of Transportation (Medical): Not on file    Lack of Transportation (Non-Medical):  Not on file   Physical Activity:     Days of Exercise per Week: Not on file    Minutes of Exercise per Session: Not on file   Stress:     Feeling of Stress : Not on file   Social Connections:     Frequency of Communication with Friends and Family: Not on file    Frequency of Social Gatherings with Friends and Family: Not on file    Attends [employer] Services: Not on file    Active Member of [address] [place] [address] or Organizations: Not on file    Attends Club or Organization Meetings: Not on file    Marital Status: Not on file   Intimate Partner Violence:     Fear of Current or Ex-Partner: Not on file    Emotionally Abused: Not on file    Physically Abused: Not on file    Sexually Abused: Not on file   Housing Stability:     Unable to Pay for Housing in the Last Year: Not on file    Number of [place] in the Last Year: Not on file    Unstable Housing in the Last Year: Not on file         ALLERGIES: Patient has no known allergies. Review of Systems   Constitutional: Positive for activity change, appetite change and fatigue. Negative for fever. Respiratory: Negative for chest tightness and shortness of breath. Cardiovascular: Negative for chest pain. Gastrointestinal: Positive for abdominal pain, nausea and vomiting. Neurological: Positive for dizziness and light-headedness. All other systems reviewed and are negative. Vitals:    07/06/22 0845 07/06/22 0846 07/06/22 0918   BP:  139/77    Pulse: 95     Resp: 20     Temp: 97.1 °F (36.2 °C)     SpO2: 97%  97%   Height: 5' 4\" (1.626 m)              Physical Exam  Vitals and nursing note reviewed. Constitutional:       Appearance: She is morbidly obese. She is ill-appearing. HENT:      Head: Normocephalic and atraumatic. Cardiovascular:      Rate and Rhythm: Normal rate and regular rhythm. Pulmonary:      Effort: Pulmonary effort is normal.      Breath sounds: Normal breath sounds. Abdominal:      General: Abdomen is protuberant. Palpations: Abdomen is soft. Tenderness:  There is abdominal tenderness in the right upper quadrant and epigastric area. Skin:     General: Skin is warm and dry. Coloration: Skin is pale. Neurological:      General: No focal deficit present. Mental Status: She is alert and oriented to person, place, and time. Psychiatric:         Mood and Affect: Mood normal.         Behavior: Behavior normal.          MDM  Number of Diagnoses or Management Options  Diagnosis management comments: Symptomatic cholelithiasis, cholecystitis. 28-year-old female present emergency department with worsening abdominal pain recently diagnosed with cholelithiasis. Concern for worsening symptoms. Will obtain labs, hydration with 1 L bolus normal saline, antiemetics, ultrasound right upper quadrant. Procedures    1:43 PM  Spoke to Dr. Silva Michel with general surgery he has reviewed the images does not feel that her symptoms are being caused by gallstones he is reviewed ultrasound no evidence of acute cholecystitis more concerning of transaminitis. States that he does not feel like a laparoscopic cholecystectomy would be warranted at this time. 3:13 PM  Spoke to Dr. Waleska Santiago with OB/GYN patient will be admitted to labor and delivery consult to GI spoke to Dr. Nu Grossman who will evaluate patient as well.

## 2022-07-06 NOTE — Clinical Note
Patient Class[de-identified] OBSERVATION [104]   Type of Bed: L&D [7]   Cardiac Monitoring Required?: No   Reason for Observation: Cholelithiasis and transaminitis in pregnancy   Admitting Diagnosis: Cholelithiasis [816307]   Admitting Physician: Nikkie Paez   Attending Physician: Nikkie Paez

## 2022-07-07 VITALS
HEART RATE: 91 BPM | TEMPERATURE: 98.3 F | OXYGEN SATURATION: 100 % | SYSTOLIC BLOOD PRESSURE: 132 MMHG | WEIGHT: 293 LBS | BODY MASS INDEX: 50.02 KG/M2 | DIASTOLIC BLOOD PRESSURE: 74 MMHG | HEIGHT: 64 IN | RESPIRATION RATE: 16 BRPM

## 2022-07-07 LAB
ALBUMIN SERPL-MCNC: 2.5 G/DL (ref 3.5–5)
ALBUMIN/GLOB SERPL: 0.6 {RATIO} (ref 1.1–2.2)
ALP SERPL-CCNC: 128 U/L (ref 45–117)
ALT SERPL-CCNC: 376 U/L (ref 12–78)
ANION GAP SERPL CALC-SCNC: 10 MMOL/L (ref 5–15)
AST SERPL-CCNC: 188 U/L (ref 15–37)
BILIRUB SERPL-MCNC: 0.6 MG/DL (ref 0.2–1)
BUN SERPL-MCNC: 5 MG/DL (ref 6–20)
BUN/CREAT SERPL: 11 (ref 12–20)
CALCIUM SERPL-MCNC: 8.7 MG/DL (ref 8.5–10.1)
CHLORIDE SERPL-SCNC: 107 MMOL/L (ref 97–108)
CO2 SERPL-SCNC: 20 MMOL/L (ref 21–32)
CREAT SERPL-MCNC: 0.46 MG/DL (ref 0.55–1.02)
CREAT UR-MCNC: 94 MG/DL
ERYTHROCYTE [DISTWIDTH] IN BLOOD BY AUTOMATED COUNT: 16.6 % (ref 11.5–14.5)
GLOBULIN SER CALC-MCNC: 4 G/DL (ref 2–4)
GLUCOSE SERPL-MCNC: 95 MG/DL (ref 65–100)
HAV IGM SER QL: NONREACTIVE
HBV CORE IGM SER QL: NONREACTIVE
HBV SURFACE AG SER QL: <0.1 INDEX
HBV SURFACE AG SER QL: NEGATIVE
HCT VFR BLD AUTO: 30.3 % (ref 35–47)
HCV AB SERPL QL IA: NONREACTIVE
HGB BLD-MCNC: 9.2 G/DL (ref 11.5–16)
MCH RBC QN AUTO: 22.1 PG (ref 26–34)
MCHC RBC AUTO-ENTMCNC: 30.4 G/DL (ref 30–36.5)
MCV RBC AUTO: 72.8 FL (ref 80–99)
NRBC # BLD: 0 K/UL (ref 0–0.01)
NRBC BLD-RTO: 0 PER 100 WBC
PLATELET # BLD AUTO: 292 K/UL (ref 150–400)
PMV BLD AUTO: 9.9 FL (ref 8.9–12.9)
POTASSIUM SERPL-SCNC: 4.2 MMOL/L (ref 3.5–5.1)
PROT SERPL-MCNC: 6.5 G/DL (ref 6.4–8.2)
PROT UR-MCNC: 24 MG/DL (ref 0–11.9)
PROT/CREAT UR-RTO: 0.3
RBC # BLD AUTO: 4.16 M/UL (ref 3.8–5.2)
SODIUM SERPL-SCNC: 137 MMOL/L (ref 136–145)
SP1: NORMAL
SP2: NORMAL
SP3: NORMAL
WBC # BLD AUTO: 8.8 K/UL (ref 3.6–11)

## 2022-07-07 PROCEDURE — 84156 ASSAY OF PROTEIN URINE: CPT

## 2022-07-07 PROCEDURE — 36415 COLL VENOUS BLD VENIPUNCTURE: CPT

## 2022-07-07 PROCEDURE — 80053 COMPREHEN METABOLIC PANEL: CPT

## 2022-07-07 PROCEDURE — 96360 HYDRATION IV INFUSION INIT: CPT

## 2022-07-07 PROCEDURE — 85027 COMPLETE CBC AUTOMATED: CPT

## 2022-07-07 PROCEDURE — 74011250637 HC RX REV CODE- 250/637: Performed by: OBSTETRICS & GYNECOLOGY

## 2022-07-07 PROCEDURE — 59025 FETAL NON-STRESS TEST: CPT

## 2022-07-07 PROCEDURE — 74011250636 HC RX REV CODE- 250/636: Performed by: OBSTETRICS & GYNECOLOGY

## 2022-07-07 PROCEDURE — G0378 HOSPITAL OBSERVATION PER HR: HCPCS

## 2022-07-07 PROCEDURE — 99213 OFFICE O/P EST LOW 20 MIN: CPT | Performed by: SURGERY

## 2022-07-07 RX ORDER — DIPHENHYDRAMINE HCL 25 MG
25 CAPSULE ORAL ONCE
Status: COMPLETED | OUTPATIENT
Start: 2022-07-07 | End: 2022-07-07

## 2022-07-07 RX ADMIN — SODIUM CHLORIDE, POTASSIUM CHLORIDE, SODIUM LACTATE AND CALCIUM CHLORIDE 125 ML/HR: 600; 310; 30; 20 INJECTION, SOLUTION INTRAVENOUS at 00:36

## 2022-07-07 RX ADMIN — SODIUM CHLORIDE, POTASSIUM CHLORIDE, SODIUM LACTATE AND CALCIUM CHLORIDE 125 ML/HR: 600; 310; 30; 20 INJECTION, SOLUTION INTRAVENOUS at 08:56

## 2022-07-07 RX ADMIN — DIPHENHYDRAMINE HYDROCHLORIDE 25 MG: 25 CAPSULE ORAL at 03:07

## 2022-07-07 NOTE — H&P
History & Physical    Name: Fco Bautista MRN: [id number]  SSN: [id number]    YOB: 1995  Age: 23 y.o. Sex: female      Subjective:     Reason for Admission:  Pregnancy and elevated LFTs     History of Present Illness: Ms. Ansley Lou is a 23 y.o.   @ 28w4d with a h/o gallstones. She has had gallstone pain since March first diagnosed in this pregnancy. For the past few days she has had RUQ dull aching pain but today became sharp and unrelenting, did not respond to [address] which had previously been given, and she presented to the ER. She has had nausea. Denies fevers/chills. She does report a headache today for which she was given tylenol. She was noted to have elevated LFTs and an US revealed no e/o acute cholecystitis. General surgery did not think the elevation to be 2/2 a surgical issue. GI was consulted due to the elevation and have ordered additional labs to be drawn. She is admitted due to the pregnancy. She has no OB concerns currently. She notes good FM. Denies ctx/vb/lof. Has been followed by another group to date in this pregnancy and that practitioner has joined our ([facility] practice. She has an appointment to see her next week to reestablish care. She has a h/o preEclampsia in her prior pregnancy but her BPs here have been wnl. OB History    - 2020 LTCS @ [facility]. Baby girl   G2 - current [facility] 23     Past Medical History:   Diagnosis Date    Anxiety     Asthma     Depression      No past surgical history on file.   Social History     Occupational History    Not on file   Tobacco Use    Smoking status: Never Smoker    Smokeless tobacco: Never Used   Substance and Sexual Activity    Alcohol use: No    Drug use: Not on file    Sexual activity: Not on file      Family History   Problem Relation Age of Onset    Asthma Mother     Depression Mother     No Known Problems Father     Other Maternal Grandfather         hypoglycemia  Diabetes Paternal Grandfather     Cancer Paternal Aunt         breast    Cancer Maternal Grandmother         breast       No Known Allergies  Prior to Admission medications    Medication Sig Start Date End Date Taking? Authorizing Provider   diazePAM (VALIUM) 5 mg tablet Take 1 tab 30 min prior to MRI. May take 2nd tab if needed. 10/25/18   Fiorella Ortiz NP   buPROPion XL (WELLBUTRIN XL) 300 mg XL tablet Take 300 mg by mouth every morning. Provider, Historical   cholecalciferol, VITAMIN D3, (VITAMIN D3) 5,000 unit tab tablet Take 5,000 Units by mouth daily. Provider, Historical   norgestimate-ethinyl estradiol ([facility] [place] [address],) 0.25-35 mg-mcg tab Take 1 Tab by mouth daily. Provider, Historical   hydrOXYzine HCl (ATARAX) 10 mg tablet Take 10 mg by mouth nightly as needed for Itching. Provider, Historical   dexamethasone (DECADRON) 2 mg tablet 3 po every day x 3 then 2 po every day x 3 then 1 po every day x 3 then stop 10/12/18   HanahanGilbert miner MD        Review of Systems:  A comprehensive review of systems was negative except for that written in the History of Present Illness. Objective:     Vitals:    Vitals:    22 0845 22 0846 22 0918 22 2123   BP:  139/77  130/76   Pulse: 95   88   Resp: 20   16   Temp: 97.1 °F (36.2 °C)   98.4 °F (36.9 °C)   SpO2: 97%  97% 99%   Height: 5' 4\" (1.626 m)   5' 4\" (1.626 m)      Temp (24hrs), Av.8 °F (36.6 °C), Min:97.1 °F (36.2 °C), Max:98.4 °F (36.9 °C)    BP  Min: 130/76  Max: 139/77     Physical Exam:  Patient without distress.   Heart: Regular rate and rhythm  Lung: normal respiratory effort  Abdomen: soft, without guarding, without rebound  Fundus: non tender     Membranes:  Intact  Uterine Activity:  None  Fetal Heart Rate:  Reactive       Lab/Data Review:  Recent Results (from the past 24 hour(s))   SAMPLES BEING HELD    Collection Time: 22  9:15 AM   Result Value Ref Range    SAMPLES BEING HELD RADHAMES.RED.GRN COMMENT        Add-on orders for these samples will be processed based on acceptable specimen integrity and analyte stability, which may vary by analyte. CBC WITH AUTOMATED DIFF    Collection Time: 07/06/22  9:15 AM   Result Value Ref Range    WBC 9.5 3.6 - 11.0 K/uL    RBC 4.35 3.80 - 5.20 M/uL    HGB 9.6 (L) 11.5 - 16.0 g/dL    HCT 31.6 (L) 35.0 - 47.0 %    MCV 72.6 (L) 80.0 - 99.0 FL    MCH 22.1 (L) 26.0 - 34.0 PG    MCHC 30.4 30.0 - 36.5 g/dL    RDW 16.7 (H) 11.5 - 14.5 %    PLATELET 246 381 - 253 K/uL    MPV 10.2 8.9 - 12.9 FL    NRBC 0.0 0  WBC    ABSOLUTE NRBC 0.00 0.00 - 0.01 K/uL    NEUTROPHILS 70 32 - 75 %    LYMPHOCYTES 23 12 - 49 %    MONOCYTES 6 5 - 13 %    EOSINOPHILS 0 0 - 7 %    BASOPHILS 0 0 - 1 %    IMMATURE GRANULOCYTES 0 0.0 - 0.5 %    ABS. NEUTROPHILS 6.7 1.8 - 8.0 K/UL    ABS. LYMPHOCYTES 2.2 0.8 - 3.5 K/UL    ABS. MONOCYTES 0.6 0.0 - 1.0 K/UL    ABS. EOSINOPHILS 0.0 0.0 - 0.4 K/UL    ABS. BASOPHILS 0.0 0.0 - 0.1 K/UL    ABS. IMM. GRANS. 0.0 0.00 - 0.04 K/UL    DF AUTOMATED     METABOLIC PANEL, COMPREHENSIVE    Collection Time: 07/06/22  9:15 AM   Result Value Ref Range    Sodium 137 136 - 145 mmol/L    Potassium 4.0 3.5 - 5.1 mmol/L    Chloride 106 97 - 108 mmol/L    CO2 23 21 - 32 mmol/L    Anion gap 8 5 - 15 mmol/L    Glucose 89 65 - 100 mg/dL    BUN 8 6 - 20 MG/DL    Creatinine 0.58 0.55 - 1.02 MG/DL    BUN/Creatinine ratio 14 12 - 20      GFR est AA >60 >60 ml/min/1.73m2    GFR est non-AA >60 >60 ml/min/1.73m2    Calcium 8.9 8.5 - 10.1 MG/DL    Bilirubin, total 1.0 0.2 - 1.0 MG/DL    ALT (SGPT) 403 (H) 12 - 78 U/L    AST (SGOT) 194 (H) 15 - 37 U/L    Alk.  phosphatase 143 (H) 45 - 117 U/L    Protein, total 6.9 6.4 - 8.2 g/dL    Albumin 2.7 (L) 3.5 - 5.0 g/dL    Globulin 4.2 (H) 2.0 - 4.0 g/dL    A-G Ratio 0.6 (L) 1.1 - 2.2     LIPASE    Collection Time: 07/06/22  9:15 AM   Result Value Ref Range    Lipase 129 73 - 393 U/L   URINALYSIS W/MICROSCOPIC    Collection Time: 07/06/22 9:15 AM   Result Value Ref Range    Color BROWN      Appearance TURBID (A) CLEAR      Specific gravity 1.025 1.003 - 1.030      pH (UA) 6.0 5.0 - 8.0      Protein 30 (A) NEG mg/dL    Glucose Negative NEG mg/dL    Ketone TRACE (A) NEG mg/dL    Blood Negative NEG      Urobilinogen 1.0 0.2 - 1.0 EU/dL    Nitrites Negative NEG      Leukocyte Esterase TRACE (A) NEG      WBC 5-10 0 - 4 /hpf    RBC 5-10 0 - 5 /hpf    Epithelial cells MANY (A) FEW /lpf    Bacteria 4+ (A) NEG /hpf   URINE CULTURE HOLD SAMPLE    Collection Time: 07/06/22  9:15 AM    Specimen: Serum; Urine   Result Value Ref Range    Urine culture hold        Urine on hold in Microbiology dept for 2 days. If unpreserved urine is submitted, it cannot be used for addtional testing after 24 hours, recollection will be required. BILIRUBIN, CONFIRM    Collection Time: 07/06/22  9:15 AM   Result Value Ref Range    Bilirubin UA, confirm Negative NEG     AMYLASE    Collection Time: 07/06/22  9:15 AM   Result Value Ref Range    Amylase 49 25 - 115 U/L   ACETAMINOPHEN    Collection Time: 07/06/22  9:15 AM   Result Value Ref Range    Acetaminophen level <2 (L) 10 - 30 ug/mL   HAPTOGLOBIN    Collection Time: 07/06/22  3:33 PM   Result Value Ref Range    Haptoglobin 164 30 - 200 mg/dL   LD    Collection Time: 07/06/22  3:33 PM   Result Value Ref Range     81 - 246 U/L   IRON PROFILE    Collection Time: 07/06/22  3:33 PM   Result Value Ref Range    Iron 26 (L) 35 - 150 ug/dL    TIBC 589 (H) 250 - 450 ug/dL    Iron % saturation 4 (L) 20 - 50 %   FERRITIN    Collection Time: 07/06/22  3:33 PM   Result Value Ref Range    Ferritin 5 (L) 26 - 388 NG/ML       Assessment and Plan: Active Problems:    Cholelithiasis (7/6/2022)      Elevated liver transaminase level (7/6/2022)       Will admit for pain management as well as to follow labs. Appreciate GI consultation.   Will repeat labs in the AM.  To be thorough and given her history will check  Urine PCR however she has normal BPs at this time. She has a h/o UTI and sample with 4+ bacteria but contaminated by epis. Will send UCx as well. Dispo pending.      Cintia Schuler MD  [facility]

## 2022-07-07 NOTE — PROGRESS NOTES
1900: Bedside and Verbal shift change report given to [id number] (oncoming nurse) by Kelvin Bey RN (offgoing nurse). Report included the following information SBAR, Intake/Output, MAR, Recent Results and Quality Measures. 2022: Reactive NST confirmed with Merrill RASHID. 2130: This RN at bedside for assessment. Patient reports positive fetal movement, denies abdominal pain, reports headache. Denies contractions or LOF. FOB at bedside. 2135: This RN speaking with Radha ROLDAN. Patient to stay overnight and have labs drawn in AM. Full GI consult to follow. NST qshift. PO tylenol for headache.    0119: SBAR report given to SUSANNE Irene RN.

## 2022-07-07 NOTE — PROGRESS NOTES
[address] July 7, 2022       RE: Crystal Munguia      To Whom It May Concern,    Crystal Munguia was under the care of [facility] from 7/6/22-7/7/22. This is to certify that Crystal Munguia may may return to work on 7/11/2022. Seema Phan Please feel free to contact my office if you have any questions or concerns. Thank you for your assistance in this matter.       Sincerely,  Judith Treviño

## 2022-07-07 NOTE — PROGRESS NOTES
[facility] Progress Note    Birder Liter  28w5d    Patient admitted for gallstones at 28w5d Estimated Date of Delivery: 22     States she has intermittent episodes of pain. Manages normally at home and with some PO narcotics. Has not required pain medicine since yesterday morning before admission. States she is doing fine at this time. Tolerating a normal diet. Denies ctxs, LOF, VB. Good FM. Vitals:  Patient Vitals for the past 24 hrs:   BP Temp Pulse Resp SpO2 Height Weight   22 0722 132/74 98.3 °F (36.8 °C) 91 16 100 % -- --   22 0309 135/72 98.6 °F (37 °C) 90 18 99 % -- --   22 0034 -- -- -- -- -- -- 149.7 kg (330 lb)   22 2123 130/76 98.4 °F (36.9 °C) 88 16 99 % 5' 4\" (1.626 m) --   22 0918 -- -- -- -- 97 % -- --   22 0846 139/77 -- -- -- -- -- --   22 0845 -- 97.1 °F (36.2 °C) 95 20 97 % 5' 4\" (1.626 m) --     Temp (24hrs), Av.1 °F (36.7 °C), Min:97.1 °F (36.2 °C), Max:98.6 °F (37 °C)    I&O:   No intake/output data recorded.  190 -  07  In: 1000 [I.V.:1000]  Out: -   NST:  pending      Exam:  Patient without distress. Abdomen soft, non-tender               Fundus soft and non tender               Lower extremities edema No                                           Labs:   Recent Results (from the past 24 hour(s))   SAMPLES BEING HELD    Collection Time: 22  9:15 AM   Result Value Ref Range    SAMPLES BEING HELD RADHAMES.RED.[email or web address]     COMMENT        Add-on orders for these samples will be processed based on acceptable specimen integrity and analyte stability, which may vary by analyte.    CBC WITH AUTOMATED DIFF    Collection Time: 22  9:15 AM   Result Value Ref Range    WBC 9.5 3.6 - 11.0 K/uL    RBC 4.35 3.80 - 5.20 M/uL    HGB 9.6 (L) 11.5 - 16.0 g/dL    HCT 31.6 (L) 35.0 - 47.0 %    MCV 72.6 (L) 80.0 - 99.0 FL    MCH 22.1 (L) 26.0 - 34.0 PG    MCHC 30.4 30.0 - 36.5 g/dL    RDW 16.7 (H) 11.5 - 14.5 % PLATELET 526 253 - 114 K/uL    MPV 10.2 8.9 - 12.9 FL    NRBC 0.0 0  WBC    ABSOLUTE NRBC 0.00 0.00 - 0.01 K/uL    NEUTROPHILS 70 32 - 75 %    LYMPHOCYTES 23 12 - 49 %    MONOCYTES 6 5 - 13 %    EOSINOPHILS 0 0 - 7 %    BASOPHILS 0 0 - 1 %    IMMATURE GRANULOCYTES 0 0.0 - 0.5 %    ABS. NEUTROPHILS 6.7 1.8 - 8.0 K/UL    ABS. LYMPHOCYTES 2.2 0.8 - 3.5 K/UL    ABS. MONOCYTES 0.6 0.0 - 1.0 K/UL    ABS. EOSINOPHILS 0.0 0.0 - 0.4 K/UL    ABS. BASOPHILS 0.0 0.0 - 0.1 K/UL    ABS. IMM. GRANS. 0.0 0.00 - 0.04 K/UL    DF AUTOMATED     METABOLIC PANEL, COMPREHENSIVE    Collection Time: 07/06/22  9:15 AM   Result Value Ref Range    Sodium 137 136 - 145 mmol/L    Potassium 4.0 3.5 - 5.1 mmol/L    Chloride 106 97 - 108 mmol/L    CO2 23 21 - 32 mmol/L    Anion gap 8 5 - 15 mmol/L    Glucose 89 65 - 100 mg/dL    BUN 8 6 - 20 MG/DL    Creatinine 0.58 0.55 - 1.02 MG/DL    BUN/Creatinine ratio 14 12 - 20      GFR est AA >60 >60 ml/min/1.73m2    GFR est non-AA >60 >60 ml/min/1.73m2    Calcium 8.9 8.5 - 10.1 MG/DL    Bilirubin, total 1.0 0.2 - 1.0 MG/DL    ALT (SGPT) 403 (H) 12 - 78 U/L    AST (SGOT) 194 (H) 15 - 37 U/L    Alk.  phosphatase 143 (H) 45 - 117 U/L    Protein, total 6.9 6.4 - 8.2 g/dL    Albumin 2.7 (L) 3.5 - 5.0 g/dL    Globulin 4.2 (H) 2.0 - 4.0 g/dL    A-G Ratio 0.6 (L) 1.1 - 2.2     LIPASE    Collection Time: 07/06/22  9:15 AM   Result Value Ref Range    Lipase 129 73 - 393 U/L   URINALYSIS W/MICROSCOPIC    Collection Time: 07/06/22  9:15 AM   Result Value Ref Range    Color BROWN      Appearance TURBID (A) CLEAR      Specific gravity 1.025 1.003 - 1.030      pH (UA) 6.0 5.0 - 8.0      Protein 30 (A) NEG mg/dL    Glucose Negative NEG mg/dL    Ketone TRACE (A) NEG mg/dL    Blood Negative NEG      Urobilinogen 1.0 0.2 - 1.0 EU/dL    Nitrites Negative NEG      Leukocyte Esterase TRACE (A) NEG      WBC 5-10 0 - 4 /hpf    RBC 5-10 0 - 5 /hpf    Epithelial cells MANY (A) FEW /lpf    Bacteria 4+ (A) NEG /hpf   URINE CULTURE HOLD SAMPLE    Collection Time: 07/06/22  9:15 AM    Specimen: Serum; Urine   Result Value Ref Range    Urine culture hold        Urine on hold in Microbiology dept for 2 days. If unpreserved urine is submitted, it cannot be used for addtional testing after 24 hours, recollection will be required. BILIRUBIN, CONFIRM    Collection Time: 07/06/22  9:15 AM   Result Value Ref Range    Bilirubin UA, confirm Negative NEG     AMYLASE    Collection Time: 07/06/22  9:15 AM   Result Value Ref Range    Amylase 49 25 - 115 U/L   ACETAMINOPHEN    Collection Time: 07/06/22  9:15 AM   Result Value Ref Range    Acetaminophen level <2 (L) 10 - 30 ug/mL   HAPTOGLOBIN    Collection Time: 07/06/22  3:33 PM   Result Value Ref Range    Haptoglobin 164 30 - 200 mg/dL   LD    Collection Time: 07/06/22  3:33 PM   Result Value Ref Range     81 - 246 U/L   HEPATITIS PANEL, ACUTE    Collection Time: 07/06/22  3:33 PM   Result Value Ref Range    Hepatitis A, IgM NONREACTIVE NR      __          Hepatitis B surface Ag <0.10 Index    Hep B surface Ag Interp. Negative NEG      __          Hepatitis B core, IgM NONREACTIVE NR      __          Hep C virus Ab Interp.  NONREACTIVE NR     IRON PROFILE    Collection Time: 07/06/22  3:33 PM   Result Value Ref Range    Iron 26 (L) 35 - 150 ug/dL    TIBC 589 (H) 250 - 450 ug/dL    Iron % saturation 4 (L) 20 - 50 %   FERRITIN    Collection Time: 07/06/22  3:33 PM   Result Value Ref Range    Ferritin 5 (L) 26 - 388 NG/ML   CBC W/O DIFF    Collection Time: 07/07/22  6:47 AM   Result Value Ref Range    WBC 8.8 3.6 - 11.0 K/uL    RBC 4.16 3.80 - 5.20 M/uL    HGB 9.2 (L) 11.5 - 16.0 g/dL    HCT 30.3 (L) 35.0 - 47.0 %    MCV 72.8 (L) 80.0 - 99.0 FL    MCH 22.1 (L) 26.0 - 34.0 PG    MCHC 30.4 30.0 - 36.5 g/dL    RDW 16.6 (H) 11.5 - 14.5 %    PLATELET 799 306 - 560 K/uL    MPV 9.9 8.9 - 12.9 FL    NRBC 0.0 0  WBC    ABSOLUTE NRBC 0.00 0.00 - 4.54 K/uL   METABOLIC PANEL, COMPREHENSIVE Collection Time: 07/07/22  6:47 AM   Result Value Ref Range    Sodium 137 136 - 145 mmol/L    Potassium 4.2 3.5 - 5.1 mmol/L    Chloride 107 97 - 108 mmol/L    CO2 20 (L) 21 - 32 mmol/L    Anion gap 10 5 - 15 mmol/L    Glucose 95 65 - 100 mg/dL    BUN 5 (L) 6 - 20 MG/DL    Creatinine 0.46 (L) 0.55 - 1.02 MG/DL    BUN/Creatinine ratio 11 (L) 12 - 20      GFR est AA >60 >60 ml/min/1.73m2    GFR est non-AA >60 >60 ml/min/1.73m2    Calcium 8.7 8.5 - 10.1 MG/DL    Bilirubin, total 0.6 0.2 - 1.0 MG/DL    ALT (SGPT) 376 (H) 12 - 78 U/L    AST (SGOT) 188 (H) 15 - 37 U/L    Alk.  phosphatase 128 (H) 45 - 117 U/L    Protein, total 6.5 6.4 - 8.2 g/dL    Albumin 2.5 (L) 3.5 - 5.0 g/dL    Globulin 4.0 2.0 - 4.0 g/dL    A-G Ratio 0.6 (L) 1.1 - 2.2         Assessment and Plan:   IUP @ 28w5d   Patient Active Problem List    Diagnosis Date Noted    Cholelithiasis 07/06/2022    Elevated liver transaminase level 07/06/2022    Obesity, morbid ([address] 10/12/2018     Gallstones: labs are mildly improved this AM. Pain is controlled  - dispo pending GI   - no OB complaints      Jaqueline Eugene MD  [facility]

## 2022-07-07 NOTE — PROGRESS NOTES
4920 Assumed care of patient. Received awake and alert, up in chair at bedside. Denies abdominal pain. Denies N/V. States she feels good with no current GI symptoms. Up ad arvind ambulating in room. Denies vaginal bleeding or LOF. Denies H/A, vision changes or RUQ abdominal pain. 1130 Patient made aware of discharge order. Wants to take shower prior to leaving due to excessive perspiration overnight. Peripheral IV removed. Family at bedside. 1210 Discharge instructions reviewed with patient, verbalizes understanding of all. No med changes. Aware to follow up in office OB/GYN in one week, states she already has an upcoming appt scheduled for Tuesday. No complaints upon discharge. 1225 Patient ambulated off unit at this time with no complaints.

## 2022-07-07 NOTE — PROGRESS NOTES
Progress Note    Patient:  Both MRN: [id number]  SSN: [id number]    YOB: 1995  Age: 23 y.o. Sex: female      Admit Date: 2022    * No surgery found *    Procedure:      Subjective:     Ms. Salma Ryder is doing fine. She is tolerating a regular diet and not having any pain. Objective:     Visit Vitals  /74 (BP 1 Location: Right arm, BP Patient Position: Sitting)   Pulse 91   Temp 98.3 °F (36.8 °C)   Resp 16   Ht 5' 4\" (1.626 m)   Wt 330 lb (149.7 kg)   SpO2 100%   BMI 56.64 kg/m²       Temp (24hrs), Av.4 °F (36.9 °C), Min:98.3 °F (36.8 °C), Max:98.6 °F (37 °C)      Physical Exam:    GENERAL: alert, cooperative, no distress, EYE: negative findings: anicteric sclera, ABDOMEN: Soft, obese, NT, ND.  +pregnant. Lab Review:   CMP:   Lab Results   Component Value Date/Time     2022 06:47 AM    K 4.2 2022 06:47 AM     2022 06:47 AM    CO2 20 (L) 2022 06:47 AM    AGAP 10 2022 06:47 AM    GLU 95 2022 06:47 AM    BUN 5 (L) 2022 06:47 AM    CREA 0.46 (L) 2022 06:47 AM    GFRAA >60 2022 06:47 AM    GFRNA >60 2022 06:47 AM    CA 8.7 2022 06:47 AM    ALB 2.5 (L) 2022 06:47 AM    TP 6.5 2022 06:47 AM    GLOB 4.0 2022 06:47 AM    AGRAT 0.6 (L) 2022 06:47 AM     (H) 2022 06:47 AM     CBC:   Lab Results   Component Value Date/Time    WBC 8.8 2022 06:47 AM    HGB 9.2 (L) 2022 06:47 AM    HCT 30.3 (L) 2022 06:47 AM     2022 06:47 AM       Assessment:     Hospital Problems  Date Reviewed: 10/31/2018          Codes Class Noted POA    Cholelithiasis ICD-10-CM: K80.20  ICD-9-CM: 574.20  2022 Yes        Elevated liver transaminase level ICD-10-CM: R74.01  ICD-9-CM: 790.4  2022 Yes              Plan/Recommendations/Medical Decision Making: Tolerating diet without pain or need for pain medication. Liver enzymes trending down. No surgical issues. OK for discharge from my standpoint.

## 2022-07-07 NOTE — DISCHARGE INSTRUCTIONS
Patient Education   Patient Education   Patient Education        Weeks 26 to 30 of Your Pregnancy: Care Instructions  Overview     You are now entering your last trimester of pregnancy. Your baby is growing quickly. Jose Ao probably feel your baby moving around more often. Your doctor may ask you to count your baby's kicks. Your back may ache as your body gets used to your baby's size and length. If you haven't already had the Tdap shot during this pregnancy, talk to your doctor about getting it. It will help protect your  against pertussis infection. During this time, it's important to take care of yourself and pay attention to what your body needs. If you feel sexual, you can explore ways to be close with your partner that match your comfort and desire. Follow-up care is a key part of your treatment and safety. Be sure to make and go to all appointments, and call your doctor if you are having problems. It's also a good idea to know your test results and keep a list of the medicines you take. How can you care for yourself at home? Take it easy at work  · Take frequent breaks. If possible, stop working when you are tired, and rest during your lunch hour. · Take bathroom breaks every 2 hours. · Change positions often. If you sit for long periods, stand up and walk around. · When you stand for a long time, keep one foot on a low stool with your knee bent. After standing a lot, sit with your feet up. · Avoid fumes, chemicals, and tobacco smoke. Be sexual in your own way  · Having sex during pregnancy is okay, unless your doctor tells you not to. · You may be very interested in sex, or you may have no interest at all. · Your growing belly can make it hard to find a good position during intercourse. Garnet and explore. · You may get cramps in your uterus when your partner touches your breasts. · A back rub may relieve the backache or cramps that sometimes follow orgasm.   Learn about  labor  · Watch for signs of  labor. You may be going into labor if:  ? You have menstrual-like cramps, with or without nausea. ? You have about 6 or more contractions in 1 hour, even after you have had a glass of water and are resting. ? You have a low, dull backache that does not go away when you change your position. ? You have pain or pressure in your pelvis that comes and goes in a pattern. ? You have intestinal cramping or flu-like symptoms, with or without diarrhea.  ? You notice an increase or change in your vaginal discharge. Discharge may be heavy, mucus-like, watery, or streaked with blood. ? Your water breaks. · If you think you have  labor:  ? Drink 2 or 3 glasses of water or juice. Not drinking enough fluids can cause contractions. ? Stop what you are doing, and empty your bladder. Then lie down on your left side for at least 1 hour. ? While lying on your side, find your breast bone. Put your fingers in the soft spot just below it. Move your fingers down toward your belly button to find the top of your uterus. Check to see if it is tight. ? Contractions can be weak or strong. Record your contractions for an hour. Time a contraction from the start of one contraction to the start of the next one.  ? Single or several strong contractions without a pattern are called [place] contractions. They are practice contractions but not the start of labor. They often stop if you change what you are doing. ? Call your doctor if you have regular contractions. Where can you learn more? Go to http://www.gray.com/  Enter J985 in the search box to learn more about \"Weeks 26 to 30 of Your Pregnancy: Care Instructions. \"  Current as of: 2021               Content Version: 13.2  © 6237-6101 [employer]. Care instructions adapted under license by [employer] (which disclaims liability or warranty for this information).  If you have questions about a medical condition or this instruction, always ask your healthcare professional. [address] any warranty or liability for your use of this information. Counting Your Baby's Kicks: Care Instructions  Overview     Counting your baby's kicks is one way your doctor can tell that your baby is healthy. Most women--especially in a first pregnancy--feel their baby move for the first time between 16 and 22 weeks. The movement may feel like flutters rather than kicks. Your baby may move more at certain times of the day. When you are active, you may notice less kicking than when you are resting. At your prenatal visits, your doctor will ask whether the baby is active. In your last trimester, your doctor may ask you to count the number of times you feel your baby move. Follow-up care is a key part of your treatment and safety. Be sure to make and go to all appointments, and call your doctor if you are having problems. It's also a good idea to know your test results and keep a list of the medicines you take. How do you count fetal kicks? · A common method of checking your baby's movement is to note the length of time it takes to count ten movements (such as kicks, flutters, or rolls). · Pick your baby's most active time of day to count. This may be any time from morning to evening. · If you don't feel 10 movements in an hour, have something to eat or drink and count for another hour. If you don't feel at least 10 movements in the 2-hour period, call your doctor. When should you call for help? Call your doctor now or seek immediate medical care if:    · You noticed that your baby has stopped moving or is moving much less than normal.   Watch closely for changes in your health, and be sure to contact your doctor if you have any problems. Where can you learn more?   Go to http://www.gray.com/  Enter [id number] in the search box to learn more about \"Counting Your Baby's Kicks: Care Instructions. \"  Current as of: June 16, 2021               Content Version: 13.2  © 2006-2022 [employer]. Care instructions adapted under license by [employer] (which disclaims liability or warranty for this information). If you have questions about a medical condition or this instruction, always ask your healthcare professional. [address] any warranty or liability for your use of this information. Biliary Colic: Care Instructions  Your Care Instructions     Biliary (say \"BILL-ee-air-ee\") colic is belly pain caused by gallbladder problems. It is usually caused by a gallstone moving through or blocking the common bile duct or cystic duct. Gallstones are stones that form in the gallbladder. They also can form in the common bile duct or cystic duct. These ducts carry bile from the gallbladder and the liver to the small intestine. Gallstones may be as small as a grain of sand. Or they may be as large as a golf ball. Gallstones that cause severe symptoms usually are treated with surgery to remove the gallbladder. If the first attack of biliary colic is mild, it is often safe to wait until you have had another attack before you think about having surgery. The doctor has checked you carefully. But problems can develop later. If you notice any problems or new symptoms, get medical treatment right away. Follow-up care is a key part of your treatment and safety. Be sure to make and go to all appointments, and call your doctor if you are having problems. It's also a good idea to know your test results and keep a list of the medicines you take. How can you care for yourself at home? · Take pain medicines exactly as directed. ? If the doctor gave you a prescription medicine for pain, take it as prescribed. ? If you are not taking a prescription pain medicine, ask your doctor if you can take an over-the-counter medicine.  Read and follow all instructions on the label. · Avoid foods that cause symptoms, especially fatty foods. These can cause biliary colic. · You may need more tests to look at your gallbladder. When should you call for help? Call your doctor now or seek immediate medical care if:    · You have a fever.     · You have new belly pain, or your pain gets worse.     · There is a new or increasing yellow tint to your skin or the whites of your eyes.     · Your urine is dark yellow-brown, or your stools are light-colored or white.     · You cannot keep down fluids. Watch closely for changes in your health, and be sure to contact your doctor if:    · You do not get better as expected.     · You are not getting better after 1 day (24 hours). Where can you learn more? Go to http://www.gray.com/  Enter [id number] in the search box to learn more about \"Biliary Colic: Care Instructions. \"  Current as of: September 8, 2021               Content Version: 13.2  © 2006-2022 [employer]. Care instructions adapted under license by [employer] (which disclaims liability or warranty for this information). If you have questions about a medical condition or this instruction, always ask your healthcare professional. [address] any warranty or liability for your use of this information.

## 2022-07-07 NOTE — PROGRESS NOTES
7/7/2022  12:11 PM    CM met with CHRISTEL to complete initial assessment and begin discharge planning. MOB verified and confirmed demographics. CHRISTEL lives with DOMINIC Leon ( [phone]), along with their 3 1/2 yr old, at the address on file. CHRISTEL is employed and plans to take adequate time off from work. FOMARY KAY is also employed and will be taking time off. CHRISTEL reports she has good family support, and feels like she has the support she needs when she returns home. CHRISTEL plans to breast feed baby and will be ordering a breast pump closer to delivery date. Carlos Hyman will provide follow up care for infant. CHRISTEL has car seat, bassinet/crib, clothing, bottles and all necessary supplies for baby. CHRISTEL has [employer] and will be adding baby to this policy. CM discussed process to add baby to insurance, MOB verbalized understanding. CHRISTEL is also enrolled in [facility] benefits. CHRISTEL is admitted for gallstones at 28w5d. No surgery recommended at this time. CHRISTEL will discharge home and follow up with OB outpt. Medicare Outpatient Observation Notice (MOON)/ [place] Outpatient Observation Notice (Radha Biswas) provided to patient/representative with verbal explanation of the notice. Time allotted for questions regarding the notice. Patient /representative provided a completed copy of the MOON/VOON notice. Care Management Interventions  PCP Verified by CM: Yes ([facility])  Mode of Transport at Discharge:  Other (see comment)  Transition of Care Consult (CM Consult): Discharge Planning  Support Systems: Spouse/Significant Other,Other Family Member(s)  Confirm Follow Up Transport: Family  Discharge Location  Patient Expects to be Discharged to[de-identified] Home with family assistance  Jt Pantoja

## 2022-07-07 NOTE — PROGRESS NOTES
46 - SBAR report received from Josy Ozuna RN    7283 - Pt requesting benadryl to help her sleep. TORB from Dr. Jermaine Mann for benadryl 25 mg PO once. RN administering at this time. 36 - RN at pt bedside performing shift assessment. 12 - SBAR report given to MARY Barron RN.

## 2022-07-07 NOTE — PROGRESS NOTES
1112: Spoke with MD Camille Daniel and made her aware that patient is cleared from GI and made her aware that in GI's note it states patient can be discharged. MD Camille Daniel gave TORB to discharge patient and to tell patient to follow up with OB office in 1 week. Primary RN Pat Ye made aware.

## 2022-07-08 LAB
ANA SER QL: NEGATIVE
BACTERIA SPEC CULT: NORMAL
CC UR VC: NORMAL
CERULOPLASMIN SERPL-MCNC: 75 MG/DL (ref 19–39)
CMV IGG SERPL IA-ACNC: <0.6 U/ML (ref 0–0.59)
CMV IGM SERPL IA-ACNC: <30 AU/ML (ref 0–29.9)
MITOCHONDRIA M2 IGG SER-ACNC: <20 UNITS (ref 0–20)
SERVICE CMNT-IMP: NORMAL

## 2022-07-09 LAB
EBV NA IGG SER-ACNC: 27.7 U/ML (ref 0–17.9)
EBV VCA IGG SER-ACNC: >600 U/ML (ref 0–17.9)
EBV VCA IGM SER-ACNC: <36 U/ML (ref 0–35.9)
INTERPRETATION, 169995: ABNORMAL

## 2023-07-06 NOTE — TELEPHONE ENCOUNTER
Pt wants to let Dr Shanice Maldonado know that she is cancelling her MRI for tomorrow due to insurance stating that they will not cover this procedure. / Pt has a $1100.00 deductible to meet  Best # [phone] Imiquimod Pregnancy And Lactation Text: This medication is Pregnancy Category C. It is unknown if this medication is excreted in breast milk.